# Patient Record
Sex: MALE | Race: WHITE | NOT HISPANIC OR LATINO | Employment: FULL TIME | URBAN - METROPOLITAN AREA
[De-identification: names, ages, dates, MRNs, and addresses within clinical notes are randomized per-mention and may not be internally consistent; named-entity substitution may affect disease eponyms.]

---

## 2019-09-30 ENCOUNTER — CONVERSION ENCOUNTER (OUTPATIENT)
Dept: SURGERY | Facility: CLINIC | Age: 54
End: 2019-09-30

## 2019-09-30 ENCOUNTER — OFFICE VISIT CONVERTED (OUTPATIENT)
Dept: UROLOGY | Facility: CLINIC | Age: 54
End: 2019-09-30
Attending: UROLOGY

## 2019-10-17 ENCOUNTER — HOSPITAL ENCOUNTER (OUTPATIENT)
Dept: OTHER | Facility: HOSPITAL | Age: 54
Discharge: HOME | End: 2019-10-17
Attending: NURSE PRACTITIONER

## 2019-12-13 ENCOUNTER — HOSPITAL ENCOUNTER (OUTPATIENT)
Dept: PERIOP | Facility: HOSPITAL | Age: 54
Setting detail: HOSPITAL OUTPATIENT SURGERY
Discharge: HOME | End: 2019-12-13
Attending: UROLOGY

## 2020-01-21 ENCOUNTER — HOSPITAL ENCOUNTER (OUTPATIENT)
Dept: PERIOP | Facility: HOSPITAL | Age: 55
Setting detail: HOSPITAL OUTPATIENT SURGERY
Discharge: HOME | End: 2020-01-21
Attending: UROLOGY

## 2020-02-19 ENCOUNTER — OFFICE VISIT CONVERTED (OUTPATIENT)
Dept: UROLOGY | Facility: CLINIC | Age: 55
End: 2020-02-19
Attending: UROLOGY

## 2020-05-22 ENCOUNTER — PROCEDURE VISIT CONVERTED (OUTPATIENT)
Dept: UROLOGY | Facility: CLINIC | Age: 55
End: 2020-05-22
Attending: UROLOGY

## 2020-05-22 ENCOUNTER — CONVERSION ENCOUNTER (OUTPATIENT)
Dept: SURGERY | Facility: CLINIC | Age: 55
End: 2020-05-22

## 2020-07-27 ENCOUNTER — HOSPITAL ENCOUNTER (OUTPATIENT)
Dept: PREADMISSION TESTING | Facility: HOSPITAL | Age: 55
Discharge: HOME | End: 2020-07-27
Attending: UROLOGY

## 2020-07-28 LAB — SARS-COV-2 RNA SPEC QL NAA+PROBE: NOT DETECTED

## 2020-07-30 ENCOUNTER — HOSPITAL ENCOUNTER (OUTPATIENT)
Dept: PERIOP | Facility: HOSPITAL | Age: 55
Setting detail: HOSPITAL OUTPATIENT SURGERY
Discharge: HOME | End: 2020-07-30
Attending: UROLOGY

## 2020-08-17 ENCOUNTER — HOSPITAL ENCOUNTER (OUTPATIENT)
Dept: PREADMISSION TESTING | Facility: HOSPITAL | Age: 55
Discharge: HOME | End: 2020-08-17
Attending: UROLOGY

## 2020-08-18 LAB — SARS-COV-2 RNA SPEC QL NAA+PROBE: NOT DETECTED

## 2021-05-10 NOTE — PROCEDURES
Procedure Note      Patient Name: Pedro Marcelo   Patient ID: [id number]   Sex: Male   YOB: 1965    Primary Care Provider: Amie Marcus   Referring Provider: Amie Marcus    Visit Date: May 22, 2020    Provider: Jenny Hernandez MD   [facility]   Location Address: [address] [place] [address]  [id number]   Location Phone: [phone]          The patient presents for follow-up of superficial bladder cancer.   The patient was last seen three months ago . The patient is scheduled for repeat cysto and u/a . Cystoscopy on the last visit showed bladder tumor.   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 The most recent tumor pathology has not been done.   Imaging:  Prior imaging studies have been done. These were done at diagnosis and include a CT scan of the abdomen and pelvis and no abnormalities were found.   Cystoscopy Procedure:  PROCEDURE: Flexible cystoscope was passed per urethra into the bladder without difficulty after proper consent. The bladder was inspected in a systematic meridian fashion. There was a papillary area around and just behind the left UO that appeared to be a low grade UC. Of note, there was no increased vascularity as well. Both ureteral orifices were identified and were normal in appearance. The flexible cystoscope was removed. The patient tolerated the procedure well.           Assessment  · Bladder Cancer     188.8      Plan  · Orders  o Cystoscopy (27809) - 188.8 - 05/22/2020  · Medications  o Medications have been Reconciled  o Transition of Care or Provider Policy  · Instructions  o TIME OUT PROCEDURE: Correct patient and birth date; Correct procedure; Correct Physician; Consent signed  o He will have a TURBT in the OR. Risks and benefits discussed with the patient and he is agreeable to proceed. I will do bilateral retrogrades as well.              Electronically Signed by: Jenny Hernandez MD -Author on May 22, 2020 11:08:21 AM

## 2021-05-15 VITALS
HEIGHT: 73 IN | WEIGHT: 248 LBS | DIASTOLIC BLOOD PRESSURE: 63 MMHG | SYSTOLIC BLOOD PRESSURE: 141 MMHG | BODY MASS INDEX: 32.87 KG/M2

## 2021-05-15 VITALS
SYSTOLIC BLOOD PRESSURE: 130 MMHG | WEIGHT: 252.25 LBS | BODY MASS INDEX: 34.17 KG/M2 | HEIGHT: 72 IN | DIASTOLIC BLOOD PRESSURE: 96 MMHG

## 2021-05-15 VITALS
WEIGHT: 244.37 LBS | DIASTOLIC BLOOD PRESSURE: 79 MMHG | BODY MASS INDEX: 33.1 KG/M2 | SYSTOLIC BLOOD PRESSURE: 145 MMHG | HEIGHT: 72 IN

## 2023-03-26 PROBLEM — J30.2 SEASONAL ALLERGIES: Status: ACTIVE | Noted: 2019-08-12

## 2023-03-26 PROBLEM — M54.2 NECK PAIN: Status: ACTIVE | Noted: 2022-09-29

## 2023-03-26 PROBLEM — E78.5 HYPERLIPIDEMIA: Status: ACTIVE | Noted: 2022-05-09

## 2023-03-26 PROBLEM — E11.9 DIABETES MELLITUS: Status: ACTIVE | Noted: 2022-09-29

## 2023-03-26 PROBLEM — M54.50 CHRONIC LOW BACK PAIN: Status: ACTIVE | Noted: 2023-01-13

## 2023-03-26 PROBLEM — E55.9 VITAMIN D DEFICIENCY: Status: ACTIVE | Noted: 2022-09-29

## 2023-03-26 PROBLEM — C67.9 BLADDER CANCER: Status: ACTIVE | Noted: 2020-05-22

## 2023-03-26 PROBLEM — F41.0 PANIC DISORDER: Status: ACTIVE | Noted: 2023-03-26

## 2023-03-26 PROBLEM — K21.9 GASTROESOPHAGEAL REFLUX DISEASE: Status: ACTIVE | Noted: 2022-09-29

## 2023-03-26 PROBLEM — M19.90 DEGENERATIVE JOINT DISEASE: Status: ACTIVE | Noted: 2023-03-26

## 2023-03-26 PROBLEM — M41.9 SCOLIOSIS: Status: ACTIVE | Noted: 2023-03-26

## 2023-03-26 PROBLEM — Z72.0 TOBACCO USER: Status: ACTIVE | Noted: 2022-09-29

## 2023-03-26 PROBLEM — F41.9 ANXIETY: Status: ACTIVE | Noted: 2019-08-12

## 2023-03-26 PROBLEM — G89.29 CHRONIC LOW BACK PAIN: Status: ACTIVE | Noted: 2023-01-13

## 2023-03-26 PROBLEM — J30.9 ALLERGIC RHINITIS: Status: ACTIVE | Noted: 2022-09-29

## 2023-03-26 PROBLEM — J44.9 COPD (CHRONIC OBSTRUCTIVE PULMONARY DISEASE): Status: ACTIVE | Noted: 2020-09-17

## 2023-03-26 PROBLEM — I10 ESSENTIAL HYPERTENSION: Status: ACTIVE | Noted: 2019-08-12

## 2024-08-03 ENCOUNTER — HOSPITAL ENCOUNTER (EMERGENCY)
Facility: HOSPITAL | Age: 59
Discharge: LEFT BEFORE DISCHARGE | End: 2024-08-03
Attending: EMERGENCY MEDICINE
Payer: MEDICAID

## 2024-08-03 VITALS
OXYGEN SATURATION: 96 % | WEIGHT: 271.61 LBS | BODY MASS INDEX: 36.79 KG/M2 | HEART RATE: 68 BPM | SYSTOLIC BLOOD PRESSURE: 140 MMHG | HEIGHT: 72 IN | DIASTOLIC BLOOD PRESSURE: 78 MMHG | TEMPERATURE: 97.8 F | RESPIRATION RATE: 18 BRPM

## 2024-08-03 PROCEDURE — 99281 EMR DPT VST MAYX REQ PHY/QHP: CPT

## 2024-08-03 RX ORDER — SODIUM CHLORIDE 0.9 % (FLUSH) 0.9 %
10 SYRINGE (ML) INJECTION AS NEEDED
Status: DISCONTINUED | OUTPATIENT
Start: 2024-08-03 | End: 2024-08-03 | Stop reason: HOSPADM

## 2024-08-04 NOTE — ED NOTES
ANTHONY Bryant spoke with the patient about the need to draw blood work and IV placement for further treatment. Patient stated that he did not want an IV and only wanted to be stuck for blood. Patient stated that he was scared of needles. Patient was educated that by starting and IV with a lab draw that he could save himself from getting stuck more than once with a needle. Patient stated that he thought this sodium was low and did not see why an IV was needed. This RN and Annette educated the patient that the treatment for low sodium level would require a IV placement. Patient then stated that he wanted to leave the hospital.     Patient was educated that he would be signing out against medication advice. Patient signed an AMA paper    Dementia

## 2024-08-04 NOTE — ED PROVIDER NOTES
Time: 9:34 PM EDT  Date of encounter:  8/3/2024  Independent Historian/Clinical History and Information was obtained by:   Patient    History is limited by: N/A    Chief Complaint   Patient presents with    Flank Pain    Nausea         History of Present Illness:  Patient is a 59 y.o. year old male who presents to the emergency department for evaluation of fatigue, muscle cramps in left flank pain along with sweating.  Patient states that last week he had a blood sugar rate of 217 and he had blood drawn on July 17 showing that he had low sodium.  Patient states that he does not take any medication for diabetes but was told to monitor it.  Patient states that normally ranges in the 130s or less.  Patient states he is nauseous.  He denies vomiting, diarrhea, chest pain, cough or fever.  Denies urinary symptoms.    Patient Care Team  Primary Care Provider: Yasmeen Taylor APRN    Past Medical History:     No Known Allergies  Past Medical History:   Diagnosis Date    Allergies     Anxiety     Arthritis     Bladder cancer     COPD (chronic obstructive pulmonary disease)     Diabetes mellitus     Hyperlipidemia     Hypertension     Tobacco abuse      Past Surgical History:   Procedure Laterality Date    ANAL FISTULA REPAIR      BLADDER SURGERY      TONSILLECTOMY       No family history on file.    Home Medications:  Prior to Admission medications    Medication Sig Start Date End Date Taking? Authorizing Provider   albuterol sulfate  (90 Base) MCG/ACT inhaler  1/23/23   Lisa Sommer MD   albuterol sulfate  (90 Base) MCG/ACT inhaler Inhale 2 puffs Every 4 (Four) Hours As Needed for Wheezing or Shortness of Air (cough). 12/26/23   Caryl Abbott APRN   amLODIPine (NORVASC) 5 MG tablet  1/23/23   Lisa Sommer MD   AndroGel Pump 20.25 MG/ACT (1.62%) gel  2/27/23   Lisa Sommer MD   atorvastatin (LIPITOR) 20 MG tablet  1/23/23   Lisa Sommer MD   Atrovent HFA 17 MCG/ACT  inhaler  8/18/23   Lisa Sommer MD   buprenorphine-naloxone (SUBOXONE) 8-2 MG per SL tablet  3/4/23   Lisa Sommer MD   cetirizine (zyrTEC) 10 MG tablet Take 1 tablet by mouth Every Night. 6/26/23   Olivia Quintanilla APRN   gabapentin (NEURONTIN) 300 MG capsule  3/17/23   Lisa Sommer MD   ibuprofen (ADVIL,MOTRIN) 600 MG tablet  7/14/23   Lisa Sommer MD   ibuprofen (ADVIL,MOTRIN) 800 MG tablet  6/29/23   Lisa Sommer MD   lisinopril (PRINIVIL,ZESTRIL) 40 MG tablet  1/23/23   Lisa Sommer MD   meclizine (ANTIVERT) 25 MG tablet  12/5/22   Lisa Sommer MD   montelukast (SINGULAIR) 10 MG tablet Take 1 tablet by mouth Every Night. 6/26/23   Olivia Quintanilla APRN   omeprazole (priLOSEC) 40 MG capsule  7/20/23   Lisa Sommer MD   pantoprazole (PROTONIX) 40 MG EC tablet Take 1 tablet by mouth Daily. 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 Negative for chills.   HENT: Negative.     Eyes: Negative.    Respiratory: Negative.  Negative for cough.    Cardiovascular: Negative.    Gastrointestinal:  Positive for nausea. Negative for diarrhea and vomiting.   Endocrine: Negative.    Genitourinary:  Positive for flank pain. Negative for difficulty urinating and dysuria.  "  Musculoskeletal:  Positive for back pain and myalgias.   Skin: Negative.    Allergic/Immunologic: Negative.    Neurological: Negative.    Hematological: Negative.    Psychiatric/Behavioral: Negative.          Physical Exam:  /78 (BP Location: Left arm, Patient Position: Sitting)   Pulse 68   Temp 97.8 °F (36.6 °C) (Oral)   Resp 18   Ht 182.9 cm (72\")   Wt 123 kg (271 lb 9.7 oz)   SpO2 96%   BMI 36.84 kg/m²         Physical Exam  Vitals and nursing note reviewed.   Constitutional:       Appearance: Normal appearance.   HENT:      Head: Normocephalic and atraumatic.      Nose: Nose normal.      Mouth/Throat:      Mouth: Mucous membranes are moist.   Eyes:      Extraocular Movements: Extraocular movements intact.      Conjunctiva/sclera: Conjunctivae normal.      Pupils: Pupils are equal, round, and reactive to light.   Cardiovascular:      Rate and Rhythm: Normal rate and regular rhythm.      Heart sounds: Normal heart sounds.   Pulmonary:      Effort: Pulmonary effort is normal.      Breath sounds: Normal breath sounds.   Abdominal:      General: Abdomen is flat.      Palpations: Abdomen is soft.      Tenderness: There is left CVA tenderness (mild). There is no right CVA tenderness.   Musculoskeletal:         General: Normal range of motion.      Cervical back: Normal range of motion and neck supple.   Skin:     General: Skin is warm and dry.          Neurological:      General: No focal deficit present.      Mental Status: He is alert and oriented to person, place, and time.   Psychiatric:         Mood and Affect: Mood normal.         Behavior: Behavior normal.                Procedures:  Procedures      Medical Decision Making:      Comorbidities that affect care:    COPD, Diabetes, Hypertension    External Notes reviewed:    Previous Clinic Note: Urgent care visit 2/1/2024 and Previous Labs: Sodium from 7/17/2024 was 135      The following orders were placed and all results were independently " analyzed by me:  Orders Placed This Encounter   Procedures    [place] Draw    Comprehensive Metabolic Panel    Lipase    Urinalysis With Microscopic If Indicated (No Culture) - Urine, Clean Catch    Lactic Acid, Plasma    CBC Auto Differential    NPO Diet NPO Type: Strict NPO    Undress & Gown    Insert Peripheral IV    CBC & Differential    Green Top (Gel)    Lavender Top    Gold Top - SST    Light Blue Top       Medications Given in the Emergency Department:  Medications   sodium chloride 0.9 % flush 10 mL (has no administration in time range)        ED Course:    The patient was initially evaluated in the triage area where orders were placed. The patient was later dispositioned by Annette Scruggs PA-C.      The patient was advised to stay for completion of workup which includes but is not limited to communication of labs and radiological results, reassessment and plan. The patient was advised that leaving prior to disposition by a provider could result in critical findings that are not communicated to the patient.     ED Course as of 08/03/24 2141   Sat Aug 03, 2024   2140 RN came to me and states patient did not want an IV she tried to explain to him that if he was having muscle cramps and low electrolytes he would need fluids.  Patient told RN that he was scared of needles and she tried to explain to him that that would save him from being stuck twice.    I discussed with the patient that IV would be best and that would help prevent him from having to be stuck by needles twice if he does have an electrolyte abnormality.  Discussed with patient that his sodium is low we replace it with IV fluids.  Patient got up and states he was leaving. [AJ]      ED Course User Index  [AJ] Annette Scruggs PA-C       Labs:    Lab Results (last 24 hours)       ** No results found for the last 24 hours. **             Imaging:    No Radiology Exams Resulted Within Past 24 Hours      Differential Diagnosis and  Discussion:      Flank Pain: Differential diagnosis includes but is not limited to kidney stones, pyelonephritis, musculoskeletal disorders, renal infarction, urinary tract infection, hydronephrosis, radiculopathy, aortic aneurysm, renal cell carcinoma.  Metabolic: Differential diagnosis includes but is not limited to hypertension, hyperglycemia, hyperkalemia, hypocalcemia, metabolic acidosis, hypokalemia, hypoglycemia, malnutrition, hypothyroidism, hyperthyroidism, and adrenal insufficiency.   Myalgia: Differential diagnosis includes but is not limited to muscle overuse or strain, infections, inflammatory conditions, autoimmune diseases, medications, metabolic disorders, fibromyalgia, vascular disorders, neurological conditions, psychological factors, toxins, and muscle disorders.        MDM               Patient Care Considerations:          Consultants/Shared Management Plan:        Social Determinants of Health:          Disposition and Care Coordination:    AMA: Patient has decided to leave our facility against medical advice. I have assessed the patient´s ability to make an informed decision and it is my opinion at this time that the patient has the medical decision-making capacity to comprehend information regarding current medical condition and appreciates the impact of the disease or condition and the consequences of various options for treatment, including foregoing treatment. The patient possesses the ability to evaluate all treatment options, compare the risks and benefits of each option, communicate choice in a consistent manner over time, and is able to make rational choices. I have explained to the patient the further testing, treatment, and evaluation I would like to perform during the current emergency department visit as well as any possible alternatives that could be accomplished in a timely manner. I have outlined the possible risks of forgoing any all of these interventions and the patient  understands and acknowledges that the decision to leave may result in undesirable consequences such as death, permanent disability, and/or loss of current lifestyle. Even though leaving AMA is not ideal, I have instructed the patient to follow any discharge instructions given, take any medications prescribed, and resume care as soon as possible with any other provider. Additionally, we clearly stated that the patient is welcome to return it anytime to continue care at our facility.        Final diagnoses:   None        ED Disposition       ED Disposition   AMA    Condition   --    Comment   --               This medical record created using voice recognition software.             Annette Scruggs PA-C  08/03/24 [id number]

## 2024-08-04 NOTE — ED NOTES
Patient is refusing IV placement at this time. Patient states that he did not want an IV until he knew what was going on.     ANTHONY Bryant was made aware and verbalized that she would talk with the patient about the need for IV placement